# Patient Record
Sex: FEMALE | Race: WHITE | NOT HISPANIC OR LATINO | URBAN - METROPOLITAN AREA
[De-identification: names, ages, dates, MRNs, and addresses within clinical notes are randomized per-mention and may not be internally consistent; named-entity substitution may affect disease eponyms.]

---

## 2017-08-14 ENCOUNTER — OFFICE VISIT - HEALTHEAST (OUTPATIENT)
Dept: FAMILY MEDICINE | Facility: CLINIC | Age: 22
End: 2017-08-14

## 2017-08-14 DIAGNOSIS — Z00.00 ROUTINE GENERAL MEDICAL EXAMINATION AT A HEALTH CARE FACILITY: ICD-10-CM

## 2017-08-14 ASSESSMENT — MIFFLIN-ST. JEOR: SCORE: 1418.15

## 2021-05-31 VITALS — WEIGHT: 135.31 LBS | HEIGHT: 69 IN | BODY MASS INDEX: 20.04 KG/M2

## 2021-06-12 NOTE — PROGRESS NOTES
Assessment:        1. Routine general medical examination at a health care facility         Plan:      1. Healthcare maintenance: Adacel vaccine administered.  Counseled on vaccine and common side effects.  She declines Pap and gonorrhea/chlamydia testing as she has never been sexually active.  Encouraged regular exercise, healthy diet and adequate calcium intake.  Recommend follow-up in 1 year for next annual exam.          Subjective:      Amairani Fuetnes is a 22 y.o. female who presents for an annual exam.  No particular concerns or questions.  It has been several years since she was last seen in clinic.  Health history is reviewed.  She has no chronic medical problems.  Has some seasonal/environmental allergies.  Uses Claritin as needed.  Has had wisdom teeth removed.  No other surgery.  Reviewed family history and updated the chart.  No tobacco or drug use.  Occasional alcohol use.  She is not sexually active and never has been.  About to start her last semester at the [employer].  Studying [employer].  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  Gets regular vision and dental exams.  Tries to eat healthy and get adequate calcium.  Exercises regularly.  Review of systems is assessed and is negative.    Healthy Habits:   Regular Exercise: Yes  Sunscreen Use: Yes  Healthy Diet: Yes  Dental Visits Regularly: Yes  Seat Belt: Yes  Sexually active: No  Self Breast Exam Monthly:Yes  Hemoccults: N/A  Flex Sig: N/A  Colonoscopy: N/A  Lipid Profile: No  Glucose Screen: No  Prevention of Osteoporosis: Yes  Last Dexa: N/A  Guns at Home:  N/A      Immunization History   Administered Date(s) Administered     DTaP, historic 1995, 1995, 1995, 08/13/1996, 05/18/2000     HPV Quadrivalent 12/30/2010, 02/04/2011, 07/01/2011     Hep A, historic 07/28/2010, 02/04/2011     Hep B, historic 1995, 1995, 1995     HiB, historic 1995, 1995, 1995,  08/13/1996     IPV 1995, 1995, 1995, 05/18/2000     Influenza, seasonal,quad inj 6-35 mos 01/11/2013     MMR 08/13/1996, 05/18/2000     Meningococcal MCV4P 08/06/2007, 08/11/2015     Tdap 08/06/2007     Immunization status: due today.      Gynecologic History  Patient's last menstrual period was 08/12/2017 (exact date).  Contraception: abstinence  Last Pap: na. Results were: na  Last mammogram: na. Results were: na      OB History   No data available       No current outpatient prescriptions on file.     No current facility-administered medications for this visit.      Past Medical History:   Diagnosis Date     Environmental allergies      Past Surgical History:   Procedure Laterality Date     WISDOM TOOTH EXTRACTION       Review of patient's allergies indicates no known allergies.  Family History   Problem Relation Age of Onset     No Medical Problems Mother      No Medical Problems Father      No Medical Problems Sister      Dementia Maternal Grandmother      Alzheimers     Arthritis Paternal Grandfather      Cancer Neg Hx      Social History     Social History     Marital status: Single     Spouse name: N/A     Number of children: N/A     Years of education: N/A     Occupational History     Not on file.     Social History Main Topics     Smoking status: Never Smoker     Smokeless tobacco: Never Used     Alcohol use 1.2 oz/week     1 Glasses of wine, 1 Standard drinks or equivalent per week      Comment: varries      Drug use: No     Sexual activity: Not on file     Other Topics Concern     Not on file     Social History Narrative     No narrative on file       Review of Systems  General:  Denies problem  Eyes: Denies problem  Ears/Nose/Throat: Denies problem  Cardiovascular: Denies problem  Respiratory:  Denies problem  Gastrointestinal:  Denies problem, Genitourinary: Denies problem  Musculoskeletal:  Denies problem  Skin: Denies problem  Neurologic: Denies problem  Psychiatric: Denies  "problem  Endocrine: Denies problem  Heme/Lymphatic: Denies problem   Allergic/Immunologic: Denies problem        Objective:         /62 (Patient Site: Left Arm, Patient Position: Sitting, Cuff Size: Adult Regular)  Pulse 66  Temp 97.8  F (36.6  C) (Tympanic)   Ht 5' 9\" (1.753 m)  Wt 135 lb 5 oz (61.4 kg)  LMP 08/12/2017 (Exact Date)  SpO2 98%  Breastfeeding? No  BMI 19.98 kg/m2      Physical Exam:  General Appearance: Alert, cooperative, no distress, appears stated age  Head: Normocephalic, without obvious abnormality, atraumatic  Eyes: PERRL, conjunctiva/corneas clear, EOM's intact, wears glasses  Ears: Normal TM's and external ear canals, both ears  Nose: Nares normal, septum midline,mucosa normal, no drainage  Throat: Lips, mucosa, and tongue normal; teeth and gums normal  Neck: Supple, symmetrical, trachea midline, no adenopathy;  thyroid: not enlarged, symmetric, no tenderness/mass/nodules;   Back: Symmetric, no curvature, ROM normal  Lungs: Clear to auscultation bilaterally, respirations unlabored  Breasts: No breast masses, tenderness, asymmetry, or nipple discharge.  Heart: Regular rate and rhythm, S1 and S2 normal, no murmur, rub, or gallop, Abdomen: Soft, non-tender, bowel sounds active all four quadrants,  no masses, no organomegaly  Pelvic:Not examined  Extremities: Extremities normal, atraumatic, no cyanosis or edema  Skin: Skin color, texture, turgor normal, no rashes or lesions  Lymph nodes: Cervical, supraclavicular, and axillary nodes normal  Neurologic: Normal        "